# Patient Record
Sex: FEMALE | Race: WHITE
[De-identification: names, ages, dates, MRNs, and addresses within clinical notes are randomized per-mention and may not be internally consistent; named-entity substitution may affect disease eponyms.]

---

## 2018-01-04 ENCOUNTER — HOSPITAL ENCOUNTER (EMERGENCY)
Dept: HOSPITAL 74 - JER | Age: 48
Discharge: HOME | End: 2018-01-04
Payer: COMMERCIAL

## 2018-01-04 VITALS — SYSTOLIC BLOOD PRESSURE: 123 MMHG | TEMPERATURE: 98.6 F | HEART RATE: 82 BPM | DIASTOLIC BLOOD PRESSURE: 62 MMHG

## 2018-01-04 VITALS — BODY MASS INDEX: 24.1 KG/M2

## 2018-01-04 DIAGNOSIS — R10.13: Primary | ICD-10-CM

## 2018-01-04 DIAGNOSIS — K21.9: ICD-10-CM

## 2018-01-04 LAB
ALBUMIN SERPL-MCNC: 4 G/DL (ref 3.4–5)
ALP SERPL-CCNC: 91 U/L (ref 45–117)
ALT SERPL-CCNC: 22 U/L (ref 12–78)
ANION GAP SERPL CALC-SCNC: 11 MMOL/L (ref 8–16)
AST SERPL-CCNC: 19 U/L (ref 15–37)
BASOPHILS # BLD: 0.3 % (ref 0–2)
BILIRUB SERPL-MCNC: 0.6 MG/DL (ref 0.2–1)
BUN SERPL-MCNC: 16 MG/DL (ref 7–18)
CALCIUM SERPL-MCNC: 8.9 MG/DL (ref 8.5–10.1)
CHLORIDE SERPL-SCNC: 104 MMOL/L (ref 98–107)
CO2 SERPL-SCNC: 24 MMOL/L (ref 21–32)
CREAT SERPL-MCNC: 0.6 MG/DL (ref 0.55–1.02)
DEPRECATED RDW RBC AUTO: 13.7 % (ref 11.6–15.6)
EOSINOPHIL # BLD: 0.4 % (ref 0–4.5)
GLUCOSE SERPL-MCNC: 85 MG/DL (ref 74–106)
HCT VFR BLD CALC: 42.6 % (ref 32.4–45.2)
HGB BLD-MCNC: 14.1 GM/DL (ref 10.7–15.3)
INR BLD: 1.04 (ref 0.82–1.09)
LYMPHOCYTES # BLD: 7.4 % (ref 8–40)
MCH RBC QN AUTO: 26.9 PG (ref 25.7–33.7)
MCHC RBC AUTO-ENTMCNC: 33 G/DL (ref 32–36)
MCV RBC: 81.6 FL (ref 80–96)
MONOCYTES # BLD AUTO: 5.4 % (ref 3.8–10.2)
NEUTROPHILS # BLD: 86.5 % (ref 42.8–82.8)
PLATELET # BLD AUTO: 291 K/MM3 (ref 134–434)
PMV BLD: 7.7 FL (ref 7.5–11.1)
POTASSIUM SERPLBLD-SCNC: 4 MMOL/L (ref 3.5–5.1)
PROT SERPL-MCNC: 7.7 G/DL (ref 6.4–8.2)
PT PNL PPP: 11.7 SEC (ref 9.98–11.88)
RBC # BLD AUTO: 5.22 M/MM3 (ref 3.6–5.2)
SODIUM SERPL-SCNC: 139 MMOL/L (ref 136–145)
WBC # BLD AUTO: 8.5 K/MM3 (ref 4–10)

## 2018-01-04 PROCEDURE — 3E0337Z INTRODUCTION OF ELECTROLYTIC AND WATER BALANCE SUBSTANCE INTO PERIPHERAL VEIN, PERCUTANEOUS APPROACH: ICD-10-PCS

## 2018-01-04 PROCEDURE — 3E033GC INTRODUCTION OF OTHER THERAPEUTIC SUBSTANCE INTO PERIPHERAL VEIN, PERCUTANEOUS APPROACH: ICD-10-PCS

## 2018-01-04 NOTE — PDOC
Rapid Medical Evaluation


Time Seen by Provider: 01/04/18 16:44


Medical Evaluation: 


 Allergies











Allergy/AdvReac Type Severity Reaction Status Date / Time


 


No Known Drug Allergies Allergy   Verified 02/03/15 06:33











01/04/18 17:00


The patient presents with a chief complaint of: Stomach pain since October. 

Worsening over the past three months. C/o LUQ pain and today feels like burning 

up the throat and reports nausea. Took pantoprazole this morning. No vomiting 

today. Saw Dr. Lozada yesterday, has planned endoscopy.


I have performed a brief in-person evaluation of this patient;


Pertinent physical exam findings: Epigastric pain, Tachycardia to 108.


 I have ordered the following: CBC, CMP, Lipase, Pt/INR, UA, urine preg. IVF, 

Zofran


The patient will proceed to the ED for further evaluation.











**Discharge Disposition





- Discharge Dispostion


Last Admission D/C Date: 02/05/15





- Referrals


Referrals: 


STAFF,NOT ON [Primary Care Provider] - 





- Patient Instructions





- Post Discharge Activity

## 2018-01-04 NOTE — PDOC
History of Present Illness





- General


History Source: Patient


Exam Limitations: No Limitations





- History of Present Illness


Initial Comments: 





01/04/18 17:32


The patient is a 47 year old female with history of GERD who presents to the ED 

complaining of approximately approximately 2.5 months of ongoing epigastric pain

, burning in nature, with radiation up the sternum with associated nausea. She 

states she has been seeing her PCP and GI for this pain and taking Protonix. 

Today, she took her Protonix this morning. She ate eggs for breakfast and 

subsequently noticed worsening of her pain throughout the day with associated 

nausea. No vomiting or diarrhea. No melena or hematochezia. No fever or chills. 





<Hilary Mark - Last Filed: 01/04/18 17:32>





<Stacia Black - Last Filed: 01/04/18 18:43>





- General


Chief Complaint: Pain


Stated Complaint: STOMACH PAIN


Time Seen by Provider: 01/04/18 16:44





Past History





<Hilary Mark - Last Filed: 01/04/18 17:32>





- Past Medical History


Anemia: No


Asthma: No


Cancer: No


Cardiac Disorders: No


CVA: No


COPD: No


CHF: No


Dementia: No


Diabetes: No


GI Disorders: Yes (ACID REFLUX)


 Disorders: No


HTN: No


Hypercholesterolemia: No


Liver Disease: No


Seizures: No


Thyroid Disease: No


Other medical history: osteopenia





- Surgical History


Abdominal Surgery: No


Appendectomy: No


Cardiac Surgery: No


Cholecystectomy: No


Lung Surgery: No


Neurologic Surgery: No


Orthopedic Surgery: No





- Suicide/Smoking/Psychosocial Hx


Smoking History: Never smoked


Have you smoked in the past 12 months: No


Information on smoking cessation initiated: No


Hx Alcohol Use: No


Drug/Substance Use Hx: No


Substance Use Type: None


Hx Substance Use Treatment: No





<Stacia Black - Last Filed: 01/04/18 18:43>





- Past Medical History


Allergies/Adverse Reactions: 


 Allergies











Allergy/AdvReac Type Severity Reaction Status Date / Time


 


No Known Drug Allergies Allergy   Verified 01/04/18 17:00











Home Medications: 


Ambulatory Orders





Ibuprofen/Pseudoephedrine HCl [Advil Cold & Sinus Caplet] 1 each PO PRN 02/03/

15 


Sucralfate [Carafate -] 1 gm PO QID #28 tablet 01/04/18 











**Review of Systems





- Review of Systems


Able to Perform ROS?: Yes


Comments:: 





01/04/18 17:35


GENERAL/CONSTITUTIONAL: No fever or chills. No weakness.


HEAD, EYES, EARS, NOSE AND THROAT: No change in vision. No ear pain or 

discharge. No sore throat.


GASTROINTESTINAL: +Epigastric pain, nausea. No vomiting, diarrhea or 

constipation.


GENITOURINARY: No dysuria, frequency, or change in urination.


CARDIOVASCULAR: No chest pain or shortness of breath.


RESPIRATORY: No cough, wheezing, or hemoptysis.


MUSCULOSKELETAL: No joint or muscle swelling or pain. No neck or back pain.


SKIN: No rash


NEUROLOGIC: No headache, vertigo, loss of consciousness, or change in strength/

sensation.


ENDOCRINE: No increased thirst. No abnormal weight change.


HEMATOLOGIC/LYMPHATIC: No anemia, easy bleeding, or history of blood clots.


ALLERGIC/IMMUNOLOGIC: No hives or skin allergy.








<Hilary Mark - Last Filed: 01/04/18 17:32>





*Physical Exam





- Vital Signs


 Last Vital Signs











Temp Pulse Resp BP Pulse Ox


 


 97.1 F L  106 H  18   114/70   100 


 


 01/04/18 17:01  01/04/18 17:01  01/04/18 17:01  01/04/18 17:01  01/04/18 17:01














- Physical Exam


Comments: 





01/04/18 17:36


Constitutional: Awake, alert, oriented.  No acute distress.


Head:  Normocephalic.  Atraumatic


Eyes:  PERRL. EOMI.  Conjunctivae are not pale.


ENT:  Mucous membranes are moist and intact. Posterior pharynx without exudates 

or erythema. Uvula midline.


Neck:  Supple.  Full ROM. No lymphadenopathy.


Cardiovascular:  Regular rate.  Regular rhythm. S1, S2 regular.  Distal pulses 

are 2+ and symmetric.  


Pulmonary/Chest:  No evidence of respiratory distress.  Clear to auscultation 

bilaterally  No wheezing, rales or rhonchi.


Abdominal:  +Mild epigastric and RUQ pain. Negatibe Haro's. Soft and non-

distended.  No rebound, guarding or rigidity.  No organomegaly. No palpable 

masses. Good bowel sounds.


Back:  No CVA tenderness.


Musculoskeletal:  No edema.  No cyanosis.  No clubbing.  Full range of motion 

in all extremities.  Nocalf tenderness. Radial/pedal pulses are intact and 2+ 

bilaterally


Skin:  Skin is warm and dry.  No petechiae.  No purpura.  


Neurological:  Alert and oriented to person, place, and time.  Cranial nerves II

-XII are grossly intact. Normal speech. Strength is grossly symmetric. No 

sensory deficits.


Psychiatric:  Good eye contact.  Normal interaction, affect and behavior.








<Hilary Mark - Last Filed: 01/04/18 17:32>





- Vital Signs


 Last Vital Signs











Temp Pulse Resp BP Pulse Ox


 


 97.1 F L  106 H  18   114/70   100 


 


 01/04/18 17:01  01/04/18 17:01  01/04/18 17:01  01/04/18 17:01  01/04/18 17:01














<Stacia Black - Last Filed: 01/04/18 18:43>





ED Treatment Course





- LABORATORY


CBC & Chemistry Diagram: 


 01/04/18 17:18





 01/04/18 17:18





<Hilary Mark - Last Filed: 01/04/18 17:32>





- LABORATORY


CBC & Chemistry Diagram: 


 01/04/18 17:18





 01/04/18 17:18





<Stacia Black - Last Filed: 01/04/18 18:43>





Medical Decision Making





- Medical Decision Making





01/04/18 17:44


48yo female with 3m hx of epigastric abd pain


-scheduled for EGD - on Jan 17th with Dr. Lozada


-will check labs, RUQ u/s


-suspect PUD vs gastritis vs gallstones vs pancreatitis


-will give ivf hydration, gi cocktail, zofran


-will monitor and reassess


-pt is nontoxic in appearance


01/04/18 18:39


pt feeling much better


pain improved


stable for d/c to home


will start carafate


recommended dietary changes and pt is scheduled for egd on 1/17





<Stacia Black - Last Filed: 01/04/18 18:43>





*DC/Admit/Observation/Transfer





- Attestations


Scribe Attestion: 





01/04/18 17:37





Documentation prepared by Hilary Mark, acting as medical scribe for Stacia Black DO.





<Hilary Mark - Last Filed: 01/04/18 17:32>





- Discharge Dispostion


Admit: No





- Attestations


Physician Attestion: 





01/04/18 18:43








I, Dr. Stacia Black, DO, attest that this document has been prepared under 

my direction and personally reviewed by me in its entirety.   I further attest, 

that it accurately reflects all work, treatment, procedures and medical decision

-making performed by me.  





<Stacia Black - Last Filed: 01/04/18 18:43>


Diagnosis at time of Disposition: 


 Epigastric pain








- Discharge Dispostion


Disposition: HOME


Condition at time of disposition: Stable





- Prescriptions


Prescriptions: 


Sucralfate [Carafate -] 1 gm PO QID #28 tablet





- Referrals


Referrals: 


STAFF,NOT ON [Primary Care Provider] - 





- Patient Instructions


Printed Discharge Instructions:  DI for Epigastric Pain


Additional Instructions: 


Please take all meds as prescribed. Please keep your appointment for your EGD 

as scheduled. Please return to the ED with any further complaints. Please also 

follow up with your PMD. 





- Post Discharge Activity

## 2024-09-23 ENCOUNTER — RX ONLY (RX ONLY)
Age: 54
End: 2024-09-23

## 2024-09-23 ENCOUNTER — OFFICE (OUTPATIENT)
Dept: URBAN - METROPOLITAN AREA CLINIC 121 | Facility: CLINIC | Age: 54
Setting detail: OPHTHALMOLOGY
End: 2024-09-23
Payer: COMMERCIAL

## 2024-09-23 DIAGNOSIS — L03.213: ICD-10-CM

## 2024-09-23 DIAGNOSIS — H16.223: ICD-10-CM

## 2024-09-23 PROCEDURE — 92002 INTRM OPH EXAM NEW PATIENT: CPT | Performed by: OPHTHALMOLOGY

## 2024-09-23 ASSESSMENT — CONFRONTATIONAL VISUAL FIELD TEST (CVF)
OS_FINDINGS: FULL
OD_FINDINGS: FULL

## 2024-09-23 ASSESSMENT — LID EXAM ASSESSMENTS
OS_EDEMA: LUL 3+
OD_EDEMA: RUL 3+

## 2025-07-17 ENCOUNTER — OFFICE (OUTPATIENT)
Facility: LOCATION | Age: 55
Setting detail: OPHTHALMOLOGY
End: 2025-07-17
Payer: COMMERCIAL

## 2025-07-17 DIAGNOSIS — H47.393: ICD-10-CM

## 2025-07-17 DIAGNOSIS — H16.223: ICD-10-CM

## 2025-07-17 PROCEDURE — 92250 FUNDUS PHOTOGRAPHY W/I&R: CPT | Performed by: OPHTHALMOLOGY

## 2025-07-17 PROCEDURE — 92014 COMPRE OPH EXAM EST PT 1/>: CPT | Performed by: OPHTHALMOLOGY

## 2025-07-17 ASSESSMENT — CONFRONTATIONAL VISUAL FIELD TEST (CVF)
OS_FINDINGS: FULL
OD_FINDINGS: FULL

## 2025-07-17 ASSESSMENT — KERATOMETRY
OS_AXISANGLE_DEGREES: 113
OD_K1POWER_DIOPTERS: 42.75
OD_AXISANGLE_DEGREES: 042
OD_K2POWER_DIOPTERS: 43.25
OS_K2POWER_DIOPTERS: 43.25
OS_K1POWER_DIOPTERS: 42.50

## 2025-07-17 ASSESSMENT — REFRACTION_CURRENTRX
OD_CYLINDER: +0.75
OD_AXIS: 055
OS_AXIS: 103
OD_SPHERE: -1.75
OS_ADD: +2.00
OS_OVR_VA: 20/
OD_ADD: +2.00
OD_OVR_VA: 20/
OS_CYLINDER: +1.00
OS_SPHERE: -2.00

## 2025-07-17 ASSESSMENT — PACHYMETRY
OS_CT_CORRECTION: -4
OD_CT_CORRECTION: -4
OS_CT_UM: 595
OD_CT_UM: 591

## 2025-07-17 ASSESSMENT — REFRACTION_MANIFEST
OD_CYLINDER: +0.75
OS_ADD: +2.00
OD_SPHERE: -1.75
OS_SPHERE: -1.75
OS_CYLINDER: +1.00
OD_ADD: +2.00
OS_AXIS: 103
OD_AXIS: 055

## 2025-07-17 ASSESSMENT — REFRACTION_AUTOREFRACTION
OS_AXIS: 139
OD_CYLINDER: +0.50
OS_SPHERE: -1.50
OD_SPHERE: -1.00
OD_AXIS: 035
OS_CYLINDER: +0.50

## 2025-07-17 ASSESSMENT — SUPERFICIAL PUNCTATE KERATITIS (SPK)
OS_SPK: T
OD_SPK: T

## 2025-07-17 ASSESSMENT — VISUAL ACUITY
OD_BCVA: 20/25
OS_BCVA: 20/25

## 2025-07-17 ASSESSMENT — TONOMETRY
OS_IOP_MMHG: 16
OD_IOP_MMHG: 16